# Patient Record
Sex: FEMALE | Race: WHITE | URBAN - METROPOLITAN AREA
[De-identification: names, ages, dates, MRNs, and addresses within clinical notes are randomized per-mention and may not be internally consistent; named-entity substitution may affect disease eponyms.]

---

## 2020-08-05 ENCOUNTER — TELEPHONE ENCOUNTER (OUTPATIENT)
Dept: URBAN - METROPOLITAN AREA CLINIC 92 | Facility: CLINIC | Age: 25
End: 2020-08-05

## 2020-08-12 ENCOUNTER — DASHBOARD ENCOUNTERS (OUTPATIENT)
Age: 25
End: 2020-08-12

## 2020-08-12 ENCOUNTER — OFFICE VISIT (OUTPATIENT)
Dept: URBAN - METROPOLITAN AREA TELEHEALTH 2 | Facility: TELEHEALTH | Age: 25
End: 2020-08-12
Payer: COMMERCIAL

## 2020-08-12 ENCOUNTER — OFFICE VISIT (OUTPATIENT)
Dept: URBAN - METROPOLITAN AREA TELEHEALTH 2 | Facility: TELEHEALTH | Age: 25
End: 2020-08-12

## 2020-08-12 DIAGNOSIS — K52.832 LYMPHOCYTIC COLITIS: ICD-10-CM

## 2020-08-12 DIAGNOSIS — K83.5 BILIARY CYST: ICD-10-CM

## 2020-08-12 DIAGNOSIS — K21.9 GERD (GASTROESOPHAGEAL REFLUX DISEASE): ICD-10-CM

## 2020-08-12 PROBLEM — 235924006 BILIARY CYST: Status: ACTIVE | Noted: 2020-08-12

## 2020-08-12 PROBLEM — 1187071008 LYMPHOCYTIC COLITIS: Status: ACTIVE | Noted: 2020-08-12

## 2020-08-12 PROCEDURE — G8417 CALC BMI ABV UP PARAM F/U: HCPCS | Performed by: INTERNAL MEDICINE

## 2020-08-12 PROCEDURE — G9903 PT SCRN TBCO ID AS NON USER: HCPCS | Performed by: INTERNAL MEDICINE

## 2020-08-12 PROCEDURE — 99214 OFFICE O/P EST MOD 30 MIN: CPT | Performed by: INTERNAL MEDICINE

## 2020-08-12 PROCEDURE — G8427 DOCREV CUR MEDS BY ELIG CLIN: HCPCS | Performed by: INTERNAL MEDICINE

## 2020-08-12 PROCEDURE — 1036F TOBACCO NON-USER: CPT | Performed by: INTERNAL MEDICINE

## 2020-08-12 NOTE — HPI-OTHER HISTORIES
This is a 25-year-old female who presents for follow up.    The patient has history of type III-B biliary cyst.  She underwent a CT scan and an EGD which showed a large cystic mass.  She underwent EUS that was suggestive of a biliary cyst which was confirmed by MRI of the abdomen on 12/03/2014.  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  Less likely possibilities include a duplication cyst or inverted diverticulum.  She underwent exploratory laparotomy with transduodenal excision of type III-B biliary cyst.  MRI/MRCP on 02/19/2016 showed prior resection of type 3 choledochocele without any residual recurrent disease.  She has GERD.  She underwent EGD on 05/10/2017 showed retained bilious fluid in the stomach.  There was chronic gastritis without H pylori and random biopsies of the small bowel excluded celiac disease.  She had good control of her reflux symptoms on Protonix 40 mg daily but had difficulty obtaining medication with her medical insurance.  She is currently on Prilosec OTC 20 mg daily.  She has lymphocytic colitis.  She underwent a colonoscopy on  07/09/2018 that showed normal colon throughout except internal hemorrhoids.  Biopsies revealed lymphocytic colitis.  She was treated with 6-week course of budesonide 9 mg daily.